# Patient Record
Sex: FEMALE | Race: WHITE | NOT HISPANIC OR LATINO | Employment: OTHER | URBAN - METROPOLITAN AREA
[De-identification: names, ages, dates, MRNs, and addresses within clinical notes are randomized per-mention and may not be internally consistent; named-entity substitution may affect disease eponyms.]

---

## 2024-02-18 PROBLEM — Z15.01 BIALLELIC MUTATION OF CHEK2 GENE: Status: ACTIVE | Noted: 2024-02-18

## 2024-02-18 PROBLEM — Z15.09 BIALLELIC MUTATION OF CHEK2 GENE: Status: ACTIVE | Noted: 2024-02-18

## 2024-02-18 PROBLEM — Z80.3 FAMILY HISTORY OF BREAST CANCER: Status: ACTIVE | Noted: 2024-02-18

## 2024-02-18 PROBLEM — Z15.89 BIALLELIC MUTATION OF CHEK2 GENE: Status: ACTIVE | Noted: 2024-02-18

## 2024-02-18 NOTE — PROGRESS NOTES
Date of Service: 3/4/2024    Chief Complaint:   Amadeo Nunez is a 63 y.o. female presenting today for her annual evaluation.  She is due for a Physical Examination.  She reports no interval changes.     History of Present Illness:   Mrs. Nunez first presented on March 28, 2022 due to her concerns of her future breast cancer risk given her Positive CHEK 2 Mutation Studies.  She has a family history that is worrisome.  In August 2022, she elected bilateral prophylactic mastectomies.   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    Family history of breast cancer     Family history of malignant neoplasm of breast 8/15/2017 11:16:36 AM    [facility] [place] [facility] - [facility]: Family history of breast cancer-No Additional Notes    Family history of malignant neoplasm of breast 8/15/2017 11:16:36 AM    [facility] [place] [facility] - [facility]: Family history of breast cancer-No Additional Notes    Hypertension 2005      Past Surgical History:   Procedure Laterality Date    AUGMENTATION OF BREAST  8/2022    Mastectomy    BREAST BIOPSY      BREAST SURGERY  8-2022    COLONOSCOPY      COLPOSCOPY  11/2022    Not exact date    COSMETIC SURGERY  11/2022    Mastectomy    HYSTERECTOMY      15-20 yrs ago    OOPHORECTOMY      15 yrs or more    RALSÁNCHEZ/LOGAN      DUB        Current Outpatient Medications:     amLODIPine (NORVASC) 5 MG tablet, TAKE ONE TABLET BY MOUTH DAILY THANK YOU, Disp: , Rfl:     aspirin (ECOTRIN) 81 MG EC tablet, Take 81 mg by mouth., Disp: , Rfl:      cholecalciferol, vitamin D3, (VITAMIN D3) 25 mcg (1,000 unit) capsule, Take 1,000 Units by mouth., Disp: , Rfl:     estradioL (ESTRACE) 1 MG tablet, TAKE ONE TABLET BY MOUTH EVERY DAY **THANK YOU** (Patient not taking: Reported on 2024), Disp: 90 tablet, Rfl: 2    lisinopriL-hydrochlorothiazide (PRINZIDE,ZESTORETIC) 20-12.5 mg per tablet, TAKE TWO TABLETS BY MOUTH ONCE DAILY THANK YOU, Disp: , Rfl:     potassium chloride (KLOR-CON) 10 MEQ TbSR, , Disp: , Rfl:    Review of patient's allergies indicates:  No Known Allergies   Social History     Tobacco Use    Smoking status: Former    Smokeless tobacco: Never   Substance Use Topics    Alcohol use: Not Currently      Family History   Problem Relation Age of Onset    Breast cancer Mother     Osteoarthritis Mother     Cancer Mother     Diabetes Mother     Hypertension Mother     Heart failure Father             Breast cancer Maternal Aunt     Breast cancer Maternal Aunt         Review of Systems   Integumentary:  Negative for color change, rash, mole/lesion, thickening/swelling, dimpling of skin, drainage  Breast: Negative for mass and tenderness     Physical Exam   Constitutional: She appears well-developed. She is cooperative.   HENT: Normocephalic.   Cardiovascular:  Normal rate and regular rhythm.            Pulmonary/Chest: She exhibits no tenderness and no bony tenderness.   Abdominal: Soft. Normal appearance.   Musculoskeletal: Intact with no deficits  Neurological: She is alert.   Skin: No rash noted.   Breast and Chest Wall Evaluation:   Bilateral Breasts are surgically absent.  Right breast exhibits no mass, no rash; no skin change and no tenderness.     Left breast xhibits no mass, no rash; no skin change and no tenderness    Lymphadenopathy: No supraclavicular or axillary adenopathy.    ASSESSMENT and PLAN OF CARE     1. Family history of breast cancer  Assessment & Plan:  We discussed her family history and how it could impact her own future  risks.  We discussed family vs. genetic history and the importance and implications of each.  Genetic Counseling/Testing was offered, and all questions answered to her satisfaction.  She knows that as additional family members are diagnosed - she will need to let us know as this may change follow up and imaging recommendations.    We had a discussion concerning Breast Cancer Risk Reduction and current NCCN Guidelines. She knows that her risk can be lowered slightly with a healthy lifestyle and minimal ETOH use. Being physically active will also help. She should reduce or stay away from OCPs and HRT as possible.         2. Biallelic mutation of CHEK2 gene  Assessment & Plan:  We discussed her PE findings and the statistics regarding bilateral prophylactic mastectomy with her mutation.  She knows that she has reduced her risk of a future breast cancer by about 95% (but not 100%).  She knows that she has minimal breast tissue left behind at mastectomy - but this tissue can become cancerous.  Due to this, she will need to continue monthly SBE and report any and all changes in the interim.  She will continue to be followed on an annual basis.       Medical Decision Making: It is my impression that this patient suffers all conditions contained in this medical document.  Each of these conditions did affect our plan of care and my medical decision making today.  It is my opinion that the medical decision making concerning this patient was of minimal  difficulty based on the aforementioned conditions.  Any further recommendations will be communicated to the patient by me.  I have reviewed and verified her allergies, list of medications, medical and surgical histories, social history, and a pertinent review of symptoms.     Follow up: 1 year and PRN    For: Physical Examination

## 2024-02-18 NOTE — ASSESSMENT & PLAN NOTE
We discussed her PE findings and the statistics regarding bilateral prophylactic mastectomy with her mutation.  She knows that she has reduced her risk of a future breast cancer by about 95% (but not 100%).  She knows that she has minimal breast tissue left behind at mastectomy - but this tissue can become cancerous.  Due to this, she will need to continue monthly SBE and report any and all changes in the interim.  She will continue to be followed on an annual basis.

## 2024-02-29 ENCOUNTER — TELEPHONE (OUTPATIENT)
Dept: SURGERY | Facility: CLINIC | Age: 64
End: 2024-02-29
Payer: COMMERCIAL

## 2024-03-04 ENCOUNTER — OFFICE VISIT (OUTPATIENT)
Dept: SURGERY | Facility: CLINIC | Age: 64
End: 2024-03-04
Payer: COMMERCIAL

## 2024-03-04 DIAGNOSIS — Z15.09 BIALLELIC MUTATION OF CHEK2 GENE: ICD-10-CM

## 2024-03-04 DIAGNOSIS — Z15.89 BIALLELIC MUTATION OF CHEK2 GENE: ICD-10-CM

## 2024-03-04 DIAGNOSIS — Z15.01 BIALLELIC MUTATION OF CHEK2 GENE: ICD-10-CM

## 2024-03-04 DIAGNOSIS — Z80.3 FAMILY HISTORY OF BREAST CANCER: Primary | ICD-10-CM

## 2024-03-04 PROCEDURE — 1159F MED LIST DOCD IN RCRD: CPT | Mod: CPTII,S$GLB,, | Performed by: SPECIALIST

## 2024-03-04 PROCEDURE — 99999 PR PBB SHADOW E&M-EST. PATIENT-LVL II: CPT | Mod: PBBFAC,,, | Performed by: SPECIALIST

## 2024-03-04 PROCEDURE — 99213 OFFICE O/P EST LOW 20 MIN: CPT | Mod: S$GLB,,, | Performed by: SPECIALIST

## 2024-03-04 PROCEDURE — 4010F ACE/ARB THERAPY RXD/TAKEN: CPT | Mod: CPTII,S$GLB,, | Performed by: SPECIALIST

## 2024-03-04 PROCEDURE — 1160F RVW MEDS BY RX/DR IN RCRD: CPT | Mod: CPTII,S$GLB,, | Performed by: SPECIALIST

## 2025-01-31 ENCOUNTER — PATIENT MESSAGE (OUTPATIENT)
Dept: SURGERY | Facility: CLINIC | Age: 65
End: 2025-01-31
Payer: COMMERCIAL